# Patient Record
Sex: MALE | Race: WHITE
[De-identification: names, ages, dates, MRNs, and addresses within clinical notes are randomized per-mention and may not be internally consistent; named-entity substitution may affect disease eponyms.]

---

## 2021-09-29 ENCOUNTER — RESULT REVIEW (OUTPATIENT)
Age: 61
End: 2021-09-29

## 2021-09-29 ENCOUNTER — OUTPATIENT (OUTPATIENT)
Dept: OUTPATIENT SERVICES | Facility: HOSPITAL | Age: 61
LOS: 1 days | Discharge: HOME | End: 2021-09-29
Payer: COMMERCIAL

## 2021-09-29 ENCOUNTER — APPOINTMENT (OUTPATIENT)
Dept: WOUND CARE | Facility: HOSPITAL | Age: 61
End: 2021-09-29
Payer: COMMERCIAL

## 2021-09-29 VITALS
TEMPERATURE: 97.4 F | BODY MASS INDEX: 31.08 KG/M2 | OXYGEN SATURATION: 94 % | SYSTOLIC BLOOD PRESSURE: 175 MMHG | DIASTOLIC BLOOD PRESSURE: 78 MMHG | HEART RATE: 75 BPM | RESPIRATION RATE: 18 BRPM | WEIGHT: 250 LBS | HEIGHT: 75 IN

## 2021-09-29 DIAGNOSIS — Z78.9 OTHER SPECIFIED HEALTH STATUS: ICD-10-CM

## 2021-09-29 DIAGNOSIS — I10 ESSENTIAL (PRIMARY) HYPERTENSION: ICD-10-CM

## 2021-09-29 DIAGNOSIS — E11.621 TYPE 2 DIABETES MELLITUS WITH FOOT ULCER: ICD-10-CM

## 2021-09-29 DIAGNOSIS — Z83.3 FAMILY HISTORY OF DIABETES MELLITUS: ICD-10-CM

## 2021-09-29 DIAGNOSIS — Z87.891 PERSONAL HISTORY OF NICOTINE DEPENDENCE: ICD-10-CM

## 2021-09-29 DIAGNOSIS — E11.9 TYPE 2 DIABETES MELLITUS W/OUT COMPLICATIONS: ICD-10-CM

## 2021-09-29 PROBLEM — Z00.00 ENCOUNTER FOR PREVENTIVE HEALTH EXAMINATION: Status: ACTIVE | Noted: 2021-09-29

## 2021-09-29 PROCEDURE — G0463: CPT

## 2021-09-29 PROCEDURE — 87070 CULTURE OTHR SPECIMN AEROBIC: CPT

## 2021-09-29 PROCEDURE — 99203 OFFICE O/P NEW LOW 30 MIN: CPT

## 2021-09-29 RX ORDER — INSULIN LISPRO-AABC 100 [IU]/ML
100 INJECTION, SOLUTION SUBCUTANEOUS
Refills: 0 | Status: ACTIVE | COMMUNITY

## 2021-09-29 RX ORDER — VITAMIN B COMPLEX
CAPSULE ORAL
Refills: 0 | Status: ACTIVE | COMMUNITY

## 2021-09-29 RX ORDER — INSULIN GLARGINE 300 U/ML
300 INJECTION, SOLUTION SUBCUTANEOUS DAILY
Refills: 0 | Status: ACTIVE | COMMUNITY

## 2021-09-29 RX ORDER — CIPROFLOXACIN HYDROCHLORIDE 500 MG/1
500 TABLET, FILM COATED ORAL
Qty: 30 | Refills: 2 | Status: ACTIVE | COMMUNITY
Start: 2021-09-29 | End: 1900-01-01

## 2021-09-29 RX ORDER — UBIDECARENONE/VIT E ACET 100MG-5
CAPSULE ORAL
Refills: 0 | Status: ACTIVE | COMMUNITY

## 2021-09-29 NOTE — HISTORY OF PRESENT ILLNESS
[FreeTextEntry1] : DFU 3 5th toe left foot , skin, subcutaneous , fat and fascia , some erythema , no clinical signs of infection

## 2021-09-29 NOTE — PHYSICAL EXAM
[2 x 2] : 2 x 2  [0] : left 0 [1+] : left 1+ [Skin Ulcer] : ulcer [Alert] : alert [Oriented to Person] : oriented to person [Oriented to Place] : oriented to place [Calm] : calm [Varicose Veins Of Lower Extremities] : not present [Purpura] : no purpura  [Petechiae] : no petechiae [de-identified] : calm [de-identified] : Cad , HTN , CABG  [de-identified] : hammer toes  [de-identified] : DFU 3 , ssff , 5th toe left foot  [de-identified] : Diabetic neuropathy  [FreeTextEntry1] : Left foot lateral 5th digit  [FreeTextEntry2] : 0.5 [FreeTextEntry3] : 0.6 [FreeTextEntry4] : 0.1 [de-identified] : Scant Serous/sanguinous [de-identified] : Silver alginate [de-identified] : Cleansed with NS\par Paper tape  [de-identified] : Dorsalis Pedis: 0\par Posterior Tibialis: 0\par Doppler pulses:  Present. \par Dorsalis Pedis: Monophasic/Turbulent Bilateral                             Posterior Tibialis: Monophasic/Turbulent    Bilateral \par Extremity color: Normal \par Extremity temperature: Warm \par Capillary refill: < 3 sec\par JONAS: Auth submitted \par  [de-identified] : None [de-identified] : Normal [de-identified] : None [de-identified] : >75% [de-identified] : No [de-identified] : Every other day [de-identified] : Primary Dressing

## 2021-09-29 NOTE — REVIEW OF SYSTEMS
[Joint Stiffness] : joint stiffness [Skin Wound] : skin wound [Fever] : no fever [Chills] : no chills [Eye Pain] : no eye pain [Earache] : no earache [Shortness Of Breath] : no shortness of breath [Abdominal Pain] : no abdominal pain [Anxiety] : no anxiety [Easy Bleeding] : no tendency for easy bleeding [FreeTextEntry5] : Cabg , CAD , Diabetic cardio vascular disease  [de-identified] : DFU 3 5th toe left foot  [de-identified] : IDDM with neuropathy , long term insulin use  [de-identified] : МАРИНА

## 2021-09-29 NOTE — PLAN
[FreeTextEntry1] : Patient sent for x rays , MRI , vascular studies . C&S taken . Patient high risk for amputation , wound care . Rx Cipro 500mg . PTR 1 week   Spent 35 minutes for patient care and medical decision making.\par

## 2021-09-29 NOTE — ASSESSMENT
[Verbal] : Verbal [Written] : Written [Demo] : Demo [Patient] : Patient [Good - alert, interested, motivated] : Good - alert, interested, motivated [Verbalizes knowledge/Understanding] : Verbalizes knowledge/understanding [Dressing changes] : dressing changes [Foot Care] : foot care [Skin Care] : skin care [Signs and symptoms of infection] : sign and symptoms of infection [Nutrition] : nutrition [How and When to Call] : how and when to call [Labs and Tests] : labs and tests [Pain Management] : pain management [Patient responsibility to plan of care] : patient responsibility to plan of care [Glycemic Control] : glycemic control [] : Yes [Stable] : stable [Home] : Home [Ambulatory] : Ambulatory [Not Applicable - Long Term Care/Home Health Agency] : Long Term Care/Home Health Agency: Not Applicable [FreeTextEntry2] : Infection prevention\par Localized wound care \par Goal of remaining pain free regarding wounds.\par Promote optimal nutrition  [FreeTextEntry4] : auth submitted for MRI\par Auth submitted for vascular studies\par script provided for x - ray \par Culture swab obtained and sent to lab\par cipro E scribed \par Patient to bring in full med list next assessment \par Follow up in 1 week

## 2021-09-30 DIAGNOSIS — Z87.891 PERSONAL HISTORY OF NICOTINE DEPENDENCE: ICD-10-CM

## 2021-09-30 DIAGNOSIS — Z95.1 PRESENCE OF AORTOCORONARY BYPASS GRAFT: ICD-10-CM

## 2021-09-30 DIAGNOSIS — E11.621 TYPE 2 DIABETES MELLITUS WITH FOOT ULCER: ICD-10-CM

## 2021-09-30 DIAGNOSIS — Z83.3 FAMILY HISTORY OF DIABETES MELLITUS: ICD-10-CM

## 2021-09-30 DIAGNOSIS — E11.59 TYPE 2 DIABETES MELLITUS WITH OTHER CIRCULATORY COMPLICATIONS: ICD-10-CM

## 2021-09-30 DIAGNOSIS — Z79.82 LONG TERM (CURRENT) USE OF ASPIRIN: ICD-10-CM

## 2021-09-30 DIAGNOSIS — L97.523 NON-PRESSURE CHRONIC ULCER OF OTHER PART OF LEFT FOOT WITH NECROSIS OF MUSCLE: ICD-10-CM

## 2021-09-30 DIAGNOSIS — E11.40 TYPE 2 DIABETES MELLITUS WITH DIABETIC NEUROPATHY, UNSPECIFIED: ICD-10-CM

## 2021-09-30 DIAGNOSIS — Z79.4 LONG TERM (CURRENT) USE OF INSULIN: ICD-10-CM

## 2021-09-30 DIAGNOSIS — I11.9 HYPERTENSIVE HEART DISEASE WITHOUT HEART FAILURE: ICD-10-CM

## 2021-10-03 LAB
CULTURE RESULTS: SIGNIFICANT CHANGE UP
SPECIMEN SOURCE: SIGNIFICANT CHANGE UP

## 2021-10-06 ENCOUNTER — APPOINTMENT (OUTPATIENT)
Dept: WOUND CARE | Facility: HOSPITAL | Age: 61
End: 2021-10-06
Payer: COMMERCIAL

## 2021-10-06 ENCOUNTER — OUTPATIENT (OUTPATIENT)
Dept: OUTPATIENT SERVICES | Facility: HOSPITAL | Age: 61
LOS: 1 days | Discharge: HOME | End: 2021-10-06
Payer: COMMERCIAL

## 2021-10-06 VITALS
SYSTOLIC BLOOD PRESSURE: 157 MMHG | WEIGHT: 254 LBS | BODY MASS INDEX: 31.58 KG/M2 | HEIGHT: 75 IN | RESPIRATION RATE: 20 BRPM | HEART RATE: 82 BPM | OXYGEN SATURATION: 97 % | DIASTOLIC BLOOD PRESSURE: 75 MMHG | TEMPERATURE: 97.9 F

## 2021-10-06 DIAGNOSIS — E11.621 TYPE 2 DIABETES MELLITUS WITH FOOT ULCER: ICD-10-CM

## 2021-10-06 PROCEDURE — 99213 OFFICE O/P EST LOW 20 MIN: CPT

## 2021-10-06 PROCEDURE — G0463: CPT

## 2021-10-07 DIAGNOSIS — Z83.3 FAMILY HISTORY OF DIABETES MELLITUS: ICD-10-CM

## 2021-10-07 DIAGNOSIS — I11.9 HYPERTENSIVE HEART DISEASE WITHOUT HEART FAILURE: ICD-10-CM

## 2021-10-07 DIAGNOSIS — Z95.1 PRESENCE OF AORTOCORONARY BYPASS GRAFT: ICD-10-CM

## 2021-10-07 DIAGNOSIS — E11.40 TYPE 2 DIABETES MELLITUS WITH DIABETIC NEUROPATHY, UNSPECIFIED: ICD-10-CM

## 2021-10-07 DIAGNOSIS — L97.523 NON-PRESSURE CHRONIC ULCER OF OTHER PART OF LEFT FOOT WITH NECROSIS OF MUSCLE: ICD-10-CM

## 2021-10-07 DIAGNOSIS — E11.59 TYPE 2 DIABETES MELLITUS WITH OTHER CIRCULATORY COMPLICATIONS: ICD-10-CM

## 2021-10-07 DIAGNOSIS — Z87.891 PERSONAL HISTORY OF NICOTINE DEPENDENCE: ICD-10-CM

## 2021-10-07 DIAGNOSIS — E11.621 TYPE 2 DIABETES MELLITUS WITH FOOT ULCER: ICD-10-CM

## 2021-10-07 DIAGNOSIS — Z79.82 LONG TERM (CURRENT) USE OF ASPIRIN: ICD-10-CM

## 2021-10-07 DIAGNOSIS — Z79.4 LONG TERM (CURRENT) USE OF INSULIN: ICD-10-CM

## 2021-10-07 NOTE — PHYSICAL EXAM
[0] : left 0 [1+] : left 1+ [Skin Ulcer] : ulcer [Alert] : alert [Oriented to Person] : oriented to person [Oriented to Place] : oriented to place [Calm] : calm [Varicose Veins Of Lower Extremities] : not present [Purpura] : no purpura  [Petechiae] : no petechiae [de-identified] : calm [de-identified] : Cad , HTN , CABG  [de-identified] : hammer toes  [de-identified] : DFU 3 , ssff , 5th toe left foot  [de-identified] : Diabetic neuropathy  [FreeTextEntry1] : Left Foot Lateral 5th Digit [FreeTextEntry2] : 0.4 [FreeTextEntry3] : 0.3 [FreeTextEntry4] : 0.1 [de-identified] : Serosanguineous [de-identified] : Callus [de-identified] : Silver Alginate [de-identified] : Cleansed with Normal Saline\par  [TWNoteComboBox4] : Small [TWNoteComboBox5] : No [de-identified] : No [de-identified] : None [de-identified] : None [de-identified] : 100% [de-identified] : No [de-identified] : 3x Weekly

## 2021-10-07 NOTE — PLAN
[FreeTextEntry1] : Ulcer is improving , patient to have awaiting x rays , MRI and base line vascular studies , continue wound care and off loading   Spent 20 minutes for patient care and medical decision making.\par

## 2021-10-07 NOTE — ASSESSMENT
[Verbal] : Verbal [Patient] : Patient [Good - alert, interested, motivated] : Good - alert, interested, motivated [Verbalizes knowledge/Understanding] : Verbalizes knowledge/understanding [Dressing changes] : dressing changes [Foot Care] : foot care [Skin Care] : skin care [Signs and symptoms of infection] : sign and symptoms of infection [How and When to Call] : how and when to call [Off-loading] : off-loading [Patient responsibility to plan of care] : patient responsibility to plan of care [Stable] : stable [Home] : Home [Ambulatory] : Ambulatory [Not Applicable - Long Term Care/Home Health Agency] : Long Term Care/Home Health Agency: Not Applicable [] : No [FreeTextEntry2] : Restore Skin Integrity\par Infection Control\par Localized wound care\par Maintain acceptable pain levels at satisfactory relief.\par Demonstrates use of both nonpharmacological and pharmacological pain relief strategies [FreeTextEntry4] : Xray, MRI, Vascular Studies were just approved by insurance per patient, Patient states he will schedule studies needed prior to next assessment if possible. MD Aware\par F/U to [facility] in 1 week\par

## 2021-10-07 NOTE — HISTORY OF PRESENT ILLNESS
[FreeTextEntry1] : DFU 5 th toe left foot improving , no soi , open skin, subcutaneous and fat tissue

## 2021-10-07 NOTE — REVIEW OF SYSTEMS
[Joint Stiffness] : joint stiffness [Skin Wound] : skin wound [Fever] : no fever [Chills] : no chills [Eye Pain] : no eye pain [Earache] : no earache [Shortness Of Breath] : no shortness of breath [Abdominal Pain] : no abdominal pain [Anxiety] : no anxiety [Easy Bleeding] : no tendency for easy bleeding [FreeTextEntry5] : Cabg , CAD , Diabetic cardio vascular disease  [de-identified] : DFU 3 5th toe left foot  [de-identified] : IDDM with neuropathy , long term insulin use  [de-identified] : МАРИНА

## 2021-10-14 ENCOUNTER — OUTPATIENT (OUTPATIENT)
Dept: OUTPATIENT SERVICES | Facility: HOSPITAL | Age: 61
LOS: 1 days | End: 2021-10-14
Payer: COMMERCIAL

## 2021-10-14 ENCOUNTER — RESULT REVIEW (OUTPATIENT)
Age: 61
End: 2021-10-14

## 2021-10-14 DIAGNOSIS — E11.621 TYPE 2 DIABETES MELLITUS WITH FOOT ULCER: ICD-10-CM

## 2021-10-14 PROCEDURE — 73630 X-RAY EXAM OF FOOT: CPT | Mod: 26,50

## 2021-10-14 PROCEDURE — 73630 X-RAY EXAM OF FOOT: CPT

## 2021-10-14 PROCEDURE — A9579: CPT

## 2021-10-14 PROCEDURE — 93923 UPR/LXTR ART STDY 3+ LVLS: CPT | Mod: 26

## 2021-10-14 PROCEDURE — 73720 MRI LWR EXTREMITY W/O&W/DYE: CPT

## 2021-10-14 PROCEDURE — 73720 MRI LWR EXTREMITY W/O&W/DYE: CPT | Mod: 26,LT

## 2021-10-14 PROCEDURE — 93923 UPR/LXTR ART STDY 3+ LVLS: CPT

## 2021-10-20 ENCOUNTER — APPOINTMENT (OUTPATIENT)
Dept: WOUND CARE | Facility: HOSPITAL | Age: 61
End: 2021-10-20
Payer: COMMERCIAL

## 2021-10-20 ENCOUNTER — OUTPATIENT (OUTPATIENT)
Dept: OUTPATIENT SERVICES | Facility: HOSPITAL | Age: 61
LOS: 1 days | Discharge: HOME | End: 2021-10-20
Payer: COMMERCIAL

## 2021-10-20 VITALS
BODY MASS INDEX: 31.58 KG/M2 | HEIGHT: 75 IN | OXYGEN SATURATION: 93 % | RESPIRATION RATE: 18 BRPM | HEART RATE: 78 BPM | DIASTOLIC BLOOD PRESSURE: 84 MMHG | WEIGHT: 254 LBS | TEMPERATURE: 98.1 F | SYSTOLIC BLOOD PRESSURE: 194 MMHG

## 2021-10-20 DIAGNOSIS — L97.523 NON-PRESSURE CHRONIC ULCER OF OTHER PART OF LEFT FOOT WITH NECROSIS OF MUSCLE: ICD-10-CM

## 2021-10-20 DIAGNOSIS — E11.621 TYPE 2 DIABETES MELLITUS WITH FOOT ULCER: ICD-10-CM

## 2021-10-20 PROCEDURE — G0463: CPT

## 2021-10-20 PROCEDURE — 99213 OFFICE O/P EST LOW 20 MIN: CPT

## 2021-10-20 RX ORDER — CARVEDILOL 25 MG/1
25 TABLET, FILM COATED ORAL
Refills: 0 | Status: ACTIVE | COMMUNITY

## 2021-10-20 RX ORDER — AMLODIPINE BESYLATE AND BENAZEPRIL HYDROCHLORIDE 10; 40 MG/1; MG/1
10-40 CAPSULE ORAL
Refills: 0 | Status: ACTIVE | COMMUNITY

## 2021-10-20 RX ORDER — EPLERENONE 25 MG/1
25 TABLET, COATED ORAL
Refills: 0 | Status: ACTIVE | COMMUNITY

## 2021-10-20 RX ORDER — METFORMIN HYDROCHLORIDE 1000 MG/1
1000 TABLET, COATED ORAL
Refills: 0 | Status: ACTIVE | COMMUNITY

## 2021-10-20 RX ORDER — DULAGLUTIDE 1.5 MG/.5ML
1.5 INJECTION, SOLUTION SUBCUTANEOUS
Refills: 0 | Status: ACTIVE | COMMUNITY

## 2021-10-20 RX ORDER — ATORVASTATIN CALCIUM 40 MG/1
40 TABLET, FILM COATED ORAL
Refills: 0 | Status: ACTIVE | COMMUNITY

## 2021-10-20 RX ORDER — EMPAGLIFLOZIN 25 MG/1
25 TABLET, FILM COATED ORAL
Refills: 0 | Status: ACTIVE | COMMUNITY

## 2021-10-21 DIAGNOSIS — Z83.3 FAMILY HISTORY OF DIABETES MELLITUS: ICD-10-CM

## 2021-10-21 DIAGNOSIS — Z95.1 PRESENCE OF AORTOCORONARY BYPASS GRAFT: ICD-10-CM

## 2021-10-21 DIAGNOSIS — E11.59 TYPE 2 DIABETES MELLITUS WITH OTHER CIRCULATORY COMPLICATIONS: ICD-10-CM

## 2021-10-21 DIAGNOSIS — L97.526 NON-PRESSURE CHRONIC ULCER OF OTHER PART OF LEFT FOOT WITH BONE INVOLVEMENT WITHOUT EVIDENCE OF NECROSIS: ICD-10-CM

## 2021-10-21 DIAGNOSIS — E11.40 TYPE 2 DIABETES MELLITUS WITH DIABETIC NEUROPATHY, UNSPECIFIED: ICD-10-CM

## 2021-10-21 DIAGNOSIS — E11.621 TYPE 2 DIABETES MELLITUS WITH FOOT ULCER: ICD-10-CM

## 2021-10-21 DIAGNOSIS — Z87.891 PERSONAL HISTORY OF NICOTINE DEPENDENCE: ICD-10-CM

## 2021-10-21 DIAGNOSIS — Z79.4 LONG TERM (CURRENT) USE OF INSULIN: ICD-10-CM

## 2021-10-21 DIAGNOSIS — Z79.82 LONG TERM (CURRENT) USE OF ASPIRIN: ICD-10-CM

## 2021-10-21 DIAGNOSIS — I11.9 HYPERTENSIVE HEART DISEASE WITHOUT HEART FAILURE: ICD-10-CM

## 2021-10-21 PROBLEM — L97.523: Status: ACTIVE | Noted: 2021-09-29

## 2021-10-21 NOTE — REVIEW OF SYSTEMS
[Joint Stiffness] : joint stiffness [Skin Wound] : skin wound [Fever] : no fever [Chills] : no chills [Eye Pain] : no eye pain [Earache] : no earache [Loss Of Hearing] : no hearing loss [Shortness Of Breath] : no shortness of breath [Abdominal Pain] : no abdominal pain [Anxiety] : no anxiety [Easy Bleeding] : no tendency for easy bleeding [FreeTextEntry5] : Cabg , CAD , Diabetic cardio vascular disease  [de-identified] : DFU 3 5th toe left foot , probes to bone + Om on MRI [de-identified] : IDDM with neuropathy , long term insulin use  [de-identified] : МАРИНА

## 2021-10-21 NOTE — ASSESSMENT
[Verbal] : Verbal [Patient] : Patient [Good - alert, interested, motivated] : Good - alert, interested, motivated [Verbalizes knowledge/Understanding] : Verbalizes knowledge/understanding [Dressing changes] : dressing changes [Skin Care] : skin care [Signs and symptoms of infection] : sign and symptoms of infection [How and When to Call] : how and when to call [Patient responsibility to plan of care] : patient responsibility to plan of care [] : Yes [Stable] : stable [Home] : Home [Ambulatory] : Ambulatory [Not Applicable - Long Term Care/Home Health Agency] : Long Term Care/Home Health Agency: Not Applicable [FreeTextEntry2] : Restore Skin Integrity\par Infection Control\par Localized wound care\par Maintain acceptable pain levels at satisfactory relief.\par Demonstrates use of both nonpharmacological and pharmacological pain relief strategies [FreeTextEntry4] : DPM Discussed and Reviewed Vascular Studies, MRI and Xray with Patient, Discussion of Surgical Intervention (Arthroplasty) with Patient, Patient Stated understanding. \par DPM Referred Patient to see Vascular Surgeon for clearance prior to procedure based on vascular report, Patient Stated understanding, Copies of report given to patient today.\par Authorization Submitted for Arthroplasty per DPM orders.\par F/U to [facility] on 10/25/21 for Vascular Consult with Dr. Michael HOUSTON

## 2021-10-21 NOTE — PLAN
[FreeTextEntry1] : Reviewed all exams with patient , patient to See Dr Acevedo or his own vascular surgeon for vascular clearance . All risks and benefits have been discussed , all his questions answered . Patient will need arthroplasty of the 5th toe left foot to resolve ulcer and prevent further progression of the OM . Patient understands . Once patient is cleared vascular will procede with arthroplasty Spent 20 minutes for patient care and medical decision making.\par

## 2021-10-21 NOTE — PHYSICAL EXAM
[0] : left 0 [1+] : left 1+ [Skin Ulcer] : ulcer [Alert] : alert [Oriented to Person] : oriented to person [Oriented to Place] : oriented to place [Calm] : calm [Varicose Veins Of Lower Extremities] : not present [Purpura] : no purpura  [Petechiae] : no petechiae [de-identified] : calm [de-identified] : Cad , HTN , CABG  [de-identified] : hammer toes  [de-identified] : DFU 3 , ssff , 5th toe left foot + OM on MRI  [de-identified] : Diabetic neuropathy  [de-identified] : DPM Shaved Callus [FreeTextEntry1] : Left Foot Lateral 5th Digit [FreeTextEntry2] : 0.3 [FreeTextEntry3] : 0.3 [FreeTextEntry4] : 0.1 [de-identified] : scant Serosanguineous [de-identified] : Callus [de-identified] : Silver Alginate [de-identified] : Cleansed with Normal Saline\par  [TWNoteComboBox5] : No [de-identified] : No [de-identified] : None [de-identified] : None [de-identified] : 3x Weekly

## 2021-10-25 ENCOUNTER — APPOINTMENT (OUTPATIENT)
Dept: WOUND CARE | Facility: HOSPITAL | Age: 61
End: 2021-10-25
Payer: COMMERCIAL

## 2021-10-25 ENCOUNTER — OUTPATIENT (OUTPATIENT)
Dept: OUTPATIENT SERVICES | Facility: HOSPITAL | Age: 61
LOS: 1 days | Discharge: HOME | End: 2021-10-25
Payer: COMMERCIAL

## 2021-10-25 VITALS
WEIGHT: 254 LBS | OXYGEN SATURATION: 97 % | TEMPERATURE: 98.2 F | HEART RATE: 78 BPM | HEIGHT: 75 IN | SYSTOLIC BLOOD PRESSURE: 186 MMHG | BODY MASS INDEX: 31.58 KG/M2 | DIASTOLIC BLOOD PRESSURE: 86 MMHG | RESPIRATION RATE: 18 BRPM

## 2021-10-25 DIAGNOSIS — E11.621 TYPE 2 DIABETES MELLITUS WITH FOOT ULCER: ICD-10-CM

## 2021-10-25 PROCEDURE — 99213 OFFICE O/P EST LOW 20 MIN: CPT

## 2021-10-25 PROCEDURE — G0463: CPT

## 2021-10-25 NOTE — PHYSICAL EXAM
[1+] : left 1+ [Ankle Swelling (On Exam)] : not present [Varicose Veins Of Lower Extremities] : not present [] : not present [FreeTextEntry1] : weak L foot pulses. Cap ref 3 sec [de-identified] : l 5th toe callous surrounding 1 mm opening

## 2021-10-25 NOTE — HISTORY OF PRESENT ILLNESS
[FreeTextEntry1] : 60 yo M with hx of DM and a l 5 digt DFU for about a month. Has been slowly healing but still deep centrally. Denies an y LE pain or claudication. Thinks it may have been his shoe but he does not have paresthesias. He has hx of  CAD and CABG. MRI does not show osteo.

## 2021-10-25 NOTE — ASSESSMENT
[FreeTextEntry1] : 60 yo M with hx of DM and DFU. L 5th toe is slowly improving but its centrally deep. Dr Mata planning on performing surgery when cleared.

## 2021-10-26 DIAGNOSIS — Z79.84 LONG TERM (CURRENT) USE OF ORAL HYPOGLYCEMIC DRUGS: ICD-10-CM

## 2021-10-26 DIAGNOSIS — Z79.82 LONG TERM (CURRENT) USE OF ASPIRIN: ICD-10-CM

## 2021-10-26 DIAGNOSIS — I25.810 ATHEROSCLEROSIS OF CORONARY ARTERY BYPASS GRAFT(S) WITHOUT ANGINA PECTORIS: ICD-10-CM

## 2021-10-26 DIAGNOSIS — E11.621 TYPE 2 DIABETES MELLITUS WITH FOOT ULCER: ICD-10-CM

## 2021-10-26 DIAGNOSIS — L84 CORNS AND CALLOSITIES: ICD-10-CM

## 2021-10-26 DIAGNOSIS — Z79.899 OTHER LONG TERM (CURRENT) DRUG THERAPY: ICD-10-CM

## 2021-10-26 DIAGNOSIS — Z87.891 PERSONAL HISTORY OF NICOTINE DEPENDENCE: ICD-10-CM

## 2021-10-26 DIAGNOSIS — L97.526 NON-PRESSURE CHRONIC ULCER OF OTHER PART OF LEFT FOOT WITH BONE INVOLVEMENT WITHOUT EVIDENCE OF NECROSIS: ICD-10-CM

## 2021-10-26 DIAGNOSIS — Z95.1 PRESENCE OF AORTOCORONARY BYPASS GRAFT: ICD-10-CM

## 2021-10-26 DIAGNOSIS — Z95.5 PRESENCE OF CORONARY ANGIOPLASTY IMPLANT AND GRAFT: ICD-10-CM

## 2021-10-26 DIAGNOSIS — Z83.3 FAMILY HISTORY OF DIABETES MELLITUS: ICD-10-CM

## 2021-10-26 DIAGNOSIS — Z79.4 LONG TERM (CURRENT) USE OF INSULIN: ICD-10-CM

## 2021-11-02 ENCOUNTER — APPOINTMENT (OUTPATIENT)
Dept: WOUND CARE | Facility: HOSPITAL | Age: 61
End: 2021-11-02
Payer: COMMERCIAL

## 2021-11-02 ENCOUNTER — OUTPATIENT (OUTPATIENT)
Dept: OUTPATIENT SERVICES | Facility: HOSPITAL | Age: 61
LOS: 1 days | Discharge: HOME | End: 2021-11-02
Payer: COMMERCIAL

## 2021-11-02 VITALS
HEIGHT: 75 IN | BODY MASS INDEX: 31.58 KG/M2 | WEIGHT: 254 LBS | TEMPERATURE: 98.7 F | DIASTOLIC BLOOD PRESSURE: 90 MMHG | SYSTOLIC BLOOD PRESSURE: 170 MMHG | RESPIRATION RATE: 18 BRPM | OXYGEN SATURATION: 98 % | HEART RATE: 73 BPM

## 2021-11-02 DIAGNOSIS — E11.621 TYPE 2 DIABETES MELLITUS WITH FOOT ULCER: ICD-10-CM

## 2021-11-02 PROCEDURE — G0463: CPT

## 2021-11-02 PROCEDURE — 99213 OFFICE O/P EST LOW 20 MIN: CPT

## 2021-11-03 DIAGNOSIS — Z79.84 LONG TERM (CURRENT) USE OF ORAL HYPOGLYCEMIC DRUGS: ICD-10-CM

## 2021-11-03 DIAGNOSIS — E11.59 TYPE 2 DIABETES MELLITUS WITH OTHER CIRCULATORY COMPLICATIONS: ICD-10-CM

## 2021-11-03 DIAGNOSIS — L97.526 NON-PRESSURE CHRONIC ULCER OF OTHER PART OF LEFT FOOT WITH BONE INVOLVEMENT WITHOUT EVIDENCE OF NECROSIS: ICD-10-CM

## 2021-11-03 DIAGNOSIS — Z95.1 PRESENCE OF AORTOCORONARY BYPASS GRAFT: ICD-10-CM

## 2021-11-03 DIAGNOSIS — Z95.5 PRESENCE OF CORONARY ANGIOPLASTY IMPLANT AND GRAFT: ICD-10-CM

## 2021-11-03 DIAGNOSIS — Z79.82 LONG TERM (CURRENT) USE OF ASPIRIN: ICD-10-CM

## 2021-11-03 DIAGNOSIS — E11.40 TYPE 2 DIABETES MELLITUS WITH DIABETIC NEUROPATHY, UNSPECIFIED: ICD-10-CM

## 2021-11-03 DIAGNOSIS — E11.621 TYPE 2 DIABETES MELLITUS WITH FOOT ULCER: ICD-10-CM

## 2021-11-03 DIAGNOSIS — Z83.3 FAMILY HISTORY OF DIABETES MELLITUS: ICD-10-CM

## 2021-11-03 DIAGNOSIS — Z87.891 PERSONAL HISTORY OF NICOTINE DEPENDENCE: ICD-10-CM

## 2021-11-03 DIAGNOSIS — Z79.4 LONG TERM (CURRENT) USE OF INSULIN: ICD-10-CM

## 2021-11-03 DIAGNOSIS — I11.9 HYPERTENSIVE HEART DISEASE WITHOUT HEART FAILURE: ICD-10-CM

## 2021-11-03 NOTE — REVIEW OF SYSTEMS
[Fever] : no fever [Chills] : no chills [Eye Pain] : no eye pain [Earache] : no earache [Loss Of Hearing] : no hearing loss [Shortness Of Breath] : no shortness of breath [Abdominal Pain] : no abdominal pain [Joint Stiffness] : joint stiffness [Skin Wound] : skin wound [Anxiety] : no anxiety [Easy Bleeding] : no tendency for easy bleeding [FreeTextEntry5] : Cabg , CAD , Diabetic cardio vascular disease  [de-identified] : DFU 3 5th toe left foot , probes to bone + Om on MRI [de-identified] : IDDM with neuropathy , long term insulin use  [de-identified] : МАРИНА

## 2021-11-03 NOTE — ASSESSMENT
[Verbal] : Verbal [Written] : Written [Demo] : Demo [Patient] : Patient [Good - alert, interested, motivated] : Good - alert, interested, motivated [Verbalizes knowledge/Understanding] : Verbalizes knowledge/understanding [Dressing changes] : dressing changes [Foot Care] : foot care [Skin Care] : skin care [Signs and symptoms of infection] : sign and symptoms of infection [Surgery] : surgery [Nutrition] : nutrition [How and When to Call] : how and when to call [Pain Management] : pain management [Off-loading] : off-loading [Patient responsibility to plan of care] : patient responsibility to plan of care [Glycemic Control] : glycemic control [Stable] : stable [Home] : Home [Ambulatory] : Ambulatory [Not Applicable - Long Term Care/Home Health Agency] : Long Term Care/Home Health Agency: Not Applicable [] : No [FreeTextEntry2] : Infection prevention\par Localized wound care \par Goal of remaining pain free regarding wounds.\par Promote optimal nutrition  [FreeTextEntry3] : wound remains the same in status  [FreeTextEntry4] : Patient seeing his cardiologist to get the Okay for arthroplasty of left foot 5th digit. He will also be getting angiogram with Dr. Márquez. \par Follow up in 2 weeks

## 2021-11-03 NOTE — PHYSICAL EXAM
[0] : left 0 [1+] : left 1+ [Varicose Veins Of Lower Extremities] : not present [Purpura] : no purpura  [Petechiae] : no petechiae [Skin Ulcer] : ulcer [Alert] : alert [Oriented to Person] : oriented to person [Oriented to Place] : oriented to place [Calm] : calm [de-identified] : calm [de-identified] : Cad , HTN , CABG  [de-identified] : hammer toes  [de-identified] : DFU 3 , ssff , 5th toe left foot + OM on MRI  [de-identified] : Diabetic neuropathy  [FreeTextEntry1] : Left foot lateral 5th digit  [FreeTextEntry2] : 0.3 [FreeTextEntry3] : 0.3 [FreeTextEntry4] : 0.1 [de-identified] : scant Serous/sanguinous [de-identified] : Callus  [de-identified] : Silver alginate [de-identified] : Cleansed with NS\par Paper tape  [de-identified] : other [de-identified] : None [de-identified] : None [de-identified] : 100% [de-identified] : No [de-identified] : Every other day [de-identified] : Primary Dressing

## 2021-11-03 NOTE — PLAN
[FreeTextEntry1] : Reviewed all exams with patient , patient to have vascular procedure/clearance with his own vascular surgeon or dr toro. All risks and benefits have been discussed , all his questions answered . Patient will need arthroplasty of the 5th toe left foot to resolve ulcer and prevent further progression of the OM . Patient understands . Once patient is cleared vascular will procede with arthroplasty Spent 20 minutes for patient care and medical decision making.\par

## 2021-11-16 ENCOUNTER — OUTPATIENT (OUTPATIENT)
Dept: OUTPATIENT SERVICES | Facility: HOSPITAL | Age: 61
LOS: 1 days | Discharge: HOME | End: 2021-11-16
Payer: COMMERCIAL

## 2021-11-16 ENCOUNTER — APPOINTMENT (OUTPATIENT)
Dept: WOUND CARE | Facility: HOSPITAL | Age: 61
End: 2021-11-16
Payer: COMMERCIAL

## 2021-11-16 ENCOUNTER — NON-APPOINTMENT (OUTPATIENT)
Age: 61
End: 2021-11-16

## 2021-11-16 VITALS
RESPIRATION RATE: 18 BRPM | BODY MASS INDEX: 31.58 KG/M2 | DIASTOLIC BLOOD PRESSURE: 86 MMHG | TEMPERATURE: 98.8 F | HEIGHT: 75 IN | OXYGEN SATURATION: 98 % | WEIGHT: 254 LBS | HEART RATE: 73 BPM | SYSTOLIC BLOOD PRESSURE: 185 MMHG

## 2021-11-16 DIAGNOSIS — Z95.1 PRESENCE OF AORTOCORONARY BYPASS GRAFT: ICD-10-CM

## 2021-11-16 DIAGNOSIS — Z79.84 LONG TERM (CURRENT) USE OF ORAL HYPOGLYCEMIC DRUGS: ICD-10-CM

## 2021-11-16 DIAGNOSIS — Z87.891 PERSONAL HISTORY OF NICOTINE DEPENDENCE: ICD-10-CM

## 2021-11-16 DIAGNOSIS — I11.9 HYPERTENSIVE HEART DISEASE WITHOUT HEART FAILURE: ICD-10-CM

## 2021-11-16 DIAGNOSIS — Z83.3 FAMILY HISTORY OF DIABETES MELLITUS: ICD-10-CM

## 2021-11-16 DIAGNOSIS — Z79.4 LONG TERM (CURRENT) USE OF INSULIN: ICD-10-CM

## 2021-11-16 DIAGNOSIS — Z79.82 LONG TERM (CURRENT) USE OF ASPIRIN: ICD-10-CM

## 2021-11-16 DIAGNOSIS — E11.621 TYPE 2 DIABETES MELLITUS WITH FOOT ULCER: ICD-10-CM

## 2021-11-16 DIAGNOSIS — L97.526 NON-PRESSURE CHRONIC ULCER OF OTHER PART OF LEFT FOOT WITH BONE INVOLVEMENT WITHOUT EVIDENCE OF NECROSIS: ICD-10-CM

## 2021-11-16 DIAGNOSIS — E11.59 TYPE 2 DIABETES MELLITUS WITH OTHER CIRCULATORY COMPLICATIONS: ICD-10-CM

## 2021-11-16 DIAGNOSIS — Z95.5 PRESENCE OF CORONARY ANGIOPLASTY IMPLANT AND GRAFT: ICD-10-CM

## 2021-11-16 DIAGNOSIS — E71.40 DISORDER OF CARNITINE METABOLISM, UNSPECIFIED: ICD-10-CM

## 2021-11-16 PROCEDURE — G0463: CPT

## 2021-11-16 PROCEDURE — 87070 CULTURE OTHR SPECIMN AEROBIC: CPT

## 2021-11-16 PROCEDURE — 99213 OFFICE O/P EST LOW 20 MIN: CPT

## 2021-11-16 NOTE — ASSESSMENT
[Verbal] : Verbal [Written] : Written [Demo] : Demo [Patient] : Patient [Good - alert, interested, motivated] : Good - alert, interested, motivated [Verbalizes knowledge/Understanding] : Verbalizes knowledge/understanding [Dressing changes] : dressing changes [Foot Care] : foot care [Skin Care] : skin care [Pressure relief] : pressure relief [Signs and symptoms of infection] : sign and symptoms of infection [Surgery] : surgery [Nutrition] : nutrition [How and When to Call] : how and when to call [Pain Management] : pain management [Off-loading] : off-loading [Patient responsibility to plan of care] : patient responsibility to plan of care [Glycemic Control] : glycemic control [] : Yes [Stable] : stable [Home] : Home [Ambulatory] : Ambulatory [Not Applicable - Long Term Care/Home Health Agency] : Long Term Care/Home Health Agency: Not Applicable [FreeTextEntry2] : Infection prevention\par Localized wound care \par Goal of remaining pain free regarding wounds.\par Promote optimal nutrition \par Encourage Glycemic Control\par Pressure Relief [FreeTextEntry4] : pt cardiologist Dr. Kyler Sr authorized for Angioplasty procedure to proceed Dec 7th 2021 at [facility] by Dr. Sher (Vascular Surgery). Pre op testing will be done on Dec 6th 2021. After pt can proceed with Left Foot 5th digit arthroplasty by Dr. Mata \par Pt will F/U in 1 week for Wound care assessment\par Pt is seeing Dr. Johnson for routine podiatry care\par Culture Swab obtained and sent to lab

## 2021-11-16 NOTE — PHYSICAL EXAM
[0] : left 0 [1+] : left 1+ [Varicose Veins Of Lower Extremities] : not present [Purpura] : no purpura  [Petechiae] : no petechiae [Skin Ulcer] : ulcer [Alert] : alert [Oriented to Person] : oriented to person [Oriented to Place] : oriented to place [Calm] : calm [de-identified] : calm [de-identified] : Cad , HTN , CABG  [de-identified] : hammer toes  [de-identified] : DFU 3 , ssff , 5th toe left foot + OM on MRI  [de-identified] : Diabetic neuropathy  [de-identified] : eschar and callus removed by DPM\par open ulcer was  revealed, wound bed is 100% granular. Wound measured 0.2 x 0.2 x 0.2 and  Probes to bone\par Small sanguineous drainage [FreeTextEntry1] : Left foot lateral 5th digit [FreeTextEntry2] : 0.1 [FreeTextEntry3] : 0.3 [FreeTextEntry4] : 0.1 [de-identified] : none [de-identified] : Callus [de-identified] : Silver alginate [de-identified] : Cleansed with NS\par Paper tape  [TWNoteComboBox5] : No [TWNoteComboBox6] : Diabetic [de-identified] : No [de-identified] : other [de-identified] : None [de-identified] : None [de-identified] : None [de-identified] : No [de-identified] : Every other day [de-identified] : Primary Dressing

## 2021-11-16 NOTE — REVIEW OF SYSTEMS
[Fever] : no fever [Chills] : no chills [Eye Pain] : no eye pain [Earache] : no earache [Loss Of Hearing] : no hearing loss [Shortness Of Breath] : no shortness of breath [Abdominal Pain] : no abdominal pain [Joint Stiffness] : joint stiffness [Skin Wound] : skin wound [Anxiety] : no anxiety [Easy Bleeding] : no tendency for easy bleeding [FreeTextEntry5] : Cabg , CAD , Diabetic cardio vascular disease  [de-identified] : DFU 3 5th toe left foot , probes to bone + Om on MRI [de-identified] : IDDM with neuropathy , long term insulin use  [de-identified] : МАРИНА

## 2021-11-16 NOTE — PLAN
[FreeTextEntry1] : Patient to have angiogram on the left leg Dec 7th ,, after that patient will have arthroplasty of the left 5th toe removing the PIPJ , patient agrees with the plan and understands all the risks and benefits Spent 20 minutes for patient care and medical decision making.\par

## 2021-11-23 ENCOUNTER — APPOINTMENT (OUTPATIENT)
Dept: WOUND CARE | Facility: HOSPITAL | Age: 61
End: 2021-11-23
Payer: COMMERCIAL

## 2021-11-23 ENCOUNTER — OUTPATIENT (OUTPATIENT)
Dept: OUTPATIENT SERVICES | Facility: HOSPITAL | Age: 61
LOS: 1 days | Discharge: HOME | End: 2021-11-23
Payer: COMMERCIAL

## 2021-11-23 VITALS — SYSTOLIC BLOOD PRESSURE: 178 MMHG | DIASTOLIC BLOOD PRESSURE: 80 MMHG

## 2021-11-23 VITALS
TEMPERATURE: 98.1 F | DIASTOLIC BLOOD PRESSURE: 93 MMHG | HEART RATE: 74 BPM | WEIGHT: 254 LBS | BODY MASS INDEX: 32.6 KG/M2 | SYSTOLIC BLOOD PRESSURE: 211 MMHG | OXYGEN SATURATION: 99 % | HEIGHT: 74 IN

## 2021-11-23 VITALS — SYSTOLIC BLOOD PRESSURE: 209 MMHG | DIASTOLIC BLOOD PRESSURE: 88 MMHG

## 2021-11-23 DIAGNOSIS — E11.621 TYPE 2 DIABETES MELLITUS WITH FOOT ULCER: ICD-10-CM

## 2021-11-23 PROCEDURE — 99213 OFFICE O/P EST LOW 20 MIN: CPT

## 2021-11-23 PROCEDURE — G0463: CPT

## 2021-11-24 NOTE — PHYSICAL EXAM
[2 x 2] : 2 x 2  [0] : left 0 [1+] : left 1+ [Varicose Veins Of Lower Extremities] : not present [Purpura] : no purpura  [Petechiae] : no petechiae [Skin Ulcer] : ulcer [Alert] : alert [Oriented to Person] : oriented to person [Oriented to Place] : oriented to place [Calm] : calm [de-identified] : calm [de-identified] : Cad , HTN , CABG  [de-identified] : hammer toes  [de-identified] : DFU 3 , ssff , 5th toe left foot + OM on MRI  [de-identified] : Diabetic neuropathy  [FreeTextEntry1] : Left foot lateral 5th digit  [FreeTextEntry2] : 0.1 [FreeTextEntry3] : 0.2 [FreeTextEntry4] : 0.1 [de-identified] : callused [de-identified] : Alginate Ag [de-identified] : NSC\par DD\par  [TWNoteComboBox4] : None [TWNoteComboBox5] : No [TWNoteComboBox6] : Diabetic [de-identified] : No [de-identified] : other [de-identified] : None [de-identified] : None [de-identified] : False [de-identified] : 3x Weekly [de-identified] : Primary Dressing

## 2021-11-24 NOTE — REVIEW OF SYSTEMS
[Fever] : no fever [Chills] : no chills [Eye Pain] : no eye pain [Earache] : no earache [Loss Of Hearing] : no hearing loss [Shortness Of Breath] : no shortness of breath [Abdominal Pain] : no abdominal pain [Joint Stiffness] : joint stiffness [Skin Wound] : skin wound [Anxiety] : no anxiety [Easy Bleeding] : no tendency for easy bleeding [FreeTextEntry5] : Cabg , CAD , Diabetic cardio vascular disease  [de-identified] : DFU 3 5th toe left foot , probes to bone + Om on MRI [de-identified] : IDDM with neuropathy , long term insulin use  [de-identified] : МАРИНА

## 2021-11-24 NOTE — PLAN
[FreeTextEntry1] : Patient to have angiogram on the left leg Dec 7th , after that patient will have arthroplasty of the left 5th toe removing the PIPJ , patient agrees with the plan and understands all the risks and benefits Spent 20 minutes for patient care and medical decision making.

## 2021-11-24 NOTE — ASSESSMENT
[Verbal] : Verbal [Written] : Written [Demo] : Demo [Patient] : Patient [Good - alert, interested, motivated] : Good - alert, interested, motivated [Verbalizes knowledge/Understanding] : Verbalizes knowledge/understanding [Stable] : stable [Home] : Home [Ambulatory] : Ambulatory [Not Applicable - Long Term Care/Home Health Agency] : Long Term Care/Home Health Agency: Not Applicable [] : No [FreeTextEntry2] : Infection prevention \par Wound care (dressing changes)\par Maintain optimal skin integrity to high pressure areas\par Elevation and low sodium compliance. [FreeTextEntry4] : Pt is scheduled to have an arthroplasty on 12/7/21\par Culture reviewed with DPM. No abt therapy needed at this time.\par Pt to f/u in 1 week.

## 2021-11-25 DIAGNOSIS — Z96.1 PRESENCE OF INTRAOCULAR LENS: ICD-10-CM

## 2021-11-25 DIAGNOSIS — E11.621 TYPE 2 DIABETES MELLITUS WITH FOOT ULCER: ICD-10-CM

## 2021-11-25 DIAGNOSIS — Z79.82 LONG TERM (CURRENT) USE OF ASPIRIN: ICD-10-CM

## 2021-11-25 DIAGNOSIS — Z83.3 FAMILY HISTORY OF DIABETES MELLITUS: ICD-10-CM

## 2021-11-25 DIAGNOSIS — Z87.891 PERSONAL HISTORY OF NICOTINE DEPENDENCE: ICD-10-CM

## 2021-11-25 DIAGNOSIS — L97.526 NON-PRESSURE CHRONIC ULCER OF OTHER PART OF LEFT FOOT WITH BONE INVOLVEMENT WITHOUT EVIDENCE OF NECROSIS: ICD-10-CM

## 2021-11-25 DIAGNOSIS — E11.40 TYPE 2 DIABETES MELLITUS WITH DIABETIC NEUROPATHY, UNSPECIFIED: ICD-10-CM

## 2021-11-25 DIAGNOSIS — I11.9 HYPERTENSIVE HEART DISEASE WITHOUT HEART FAILURE: ICD-10-CM

## 2021-11-25 DIAGNOSIS — Z79.4 LONG TERM (CURRENT) USE OF INSULIN: ICD-10-CM

## 2021-11-25 DIAGNOSIS — Z79.84 LONG TERM (CURRENT) USE OF ORAL HYPOGLYCEMIC DRUGS: ICD-10-CM

## 2021-11-25 DIAGNOSIS — E11.59 TYPE 2 DIABETES MELLITUS WITH OTHER CIRCULATORY COMPLICATIONS: ICD-10-CM

## 2021-11-25 DIAGNOSIS — Z95.5 PRESENCE OF CORONARY ANGIOPLASTY IMPLANT AND GRAFT: ICD-10-CM

## 2021-12-03 ENCOUNTER — APPOINTMENT (OUTPATIENT)
Dept: WOUND CARE | Facility: HOSPITAL | Age: 61
End: 2021-12-03

## 2021-12-28 ENCOUNTER — NON-APPOINTMENT (OUTPATIENT)
Age: 61
End: 2021-12-28

## 2021-12-29 ENCOUNTER — APPOINTMENT (OUTPATIENT)
Dept: WOUND CARE | Facility: HOSPITAL | Age: 61
End: 2021-12-29
Payer: COMMERCIAL

## 2021-12-29 ENCOUNTER — OUTPATIENT (OUTPATIENT)
Dept: OUTPATIENT SERVICES | Facility: HOSPITAL | Age: 61
LOS: 1 days | Discharge: HOME | End: 2021-12-29
Payer: COMMERCIAL

## 2021-12-29 VITALS
BODY MASS INDEX: 32.6 KG/M2 | HEART RATE: 81 BPM | WEIGHT: 254 LBS | SYSTOLIC BLOOD PRESSURE: 175 MMHG | OXYGEN SATURATION: 96 % | TEMPERATURE: 98.1 F | RESPIRATION RATE: 18 BRPM | HEIGHT: 74 IN | DIASTOLIC BLOOD PRESSURE: 88 MMHG

## 2021-12-29 DIAGNOSIS — Z86.79 PERSONAL HISTORY OF OTHER DISEASES OF THE CIRCULATORY SYSTEM: ICD-10-CM

## 2021-12-29 DIAGNOSIS — E11.40 TYPE 2 DIABETES MELLITUS WITH DIABETIC NEUROPATHY, UNSPECIFIED: ICD-10-CM

## 2021-12-29 DIAGNOSIS — L97.526 NON-PRESSURE CHRONIC ULCER OF OTHER PART OF LEFT FOOT WITH BONE INVOLVEMENT WITHOUT EVIDENCE OF NECROSIS: ICD-10-CM

## 2021-12-29 DIAGNOSIS — L97.509 TYPE 2 DIABETES MELLITUS WITH FOOT ULCER: ICD-10-CM

## 2021-12-29 DIAGNOSIS — E11.621 TYPE 2 DIABETES MELLITUS WITH FOOT ULCER: ICD-10-CM

## 2021-12-29 DIAGNOSIS — I43 TYPE 2 DIABETES MELLITUS WITH OTHER CIRCULATORY COMPLICATIONS: ICD-10-CM

## 2021-12-29 DIAGNOSIS — E11.59 TYPE 2 DIABETES MELLITUS WITH OTHER CIRCULATORY COMPLICATIONS: ICD-10-CM

## 2021-12-29 PROCEDURE — G0463: CPT

## 2021-12-29 PROCEDURE — 99213 OFFICE O/P EST LOW 20 MIN: CPT

## 2021-12-29 RX ORDER — CLOPIDOGREL 75 MG/1
75 TABLET, FILM COATED ORAL
Refills: 0 | Status: ACTIVE | COMMUNITY

## 2021-12-29 NOTE — REVIEW OF SYSTEMS
[Fever] : no fever [Chills] : no chills [Eye Pain] : no eye pain [Earache] : no earache [Loss Of Hearing] : no hearing loss [Shortness Of Breath] : no shortness of breath [Abdominal Pain] : no abdominal pain [Joint Stiffness] : joint stiffness [Skin Wound] : skin wound [Anxiety] : no anxiety [Easy Bleeding] : no tendency for easy bleeding [FreeTextEntry5] : Cabg , CAD , Diabetic cardio vascular disease  [de-identified] :  + Om on MRI , DFU closed s/p revascularization  [de-identified] : IDDM with neuropathy , long term insulin use  [de-identified] : МАРИНА

## 2021-12-29 NOTE — PLAN
[FreeTextEntry1] : continue observation , patient discharged , happy with the outcome . If any wound occurs patient will return . He will f/u with all his outside specialists Spent 20 minutes for patient care and medical decision making.\par

## 2021-12-29 NOTE — PHYSICAL EXAM
[0] : left 0 [1+] : left 1+ [Varicose Veins Of Lower Extremities] : not present [Purpura] : no purpura  [Petechiae] : no petechiae [Skin Ulcer] : ulcer [Alert] : alert [Oriented to Person] : oriented to person [Oriented to Place] : oriented to place [Calm] : calm [de-identified] : calm [de-identified] : Cad , HTN , CABG  [de-identified] : hammer toes  [de-identified] : , 5th toe left foot + OM on MRI , closed after revascularization , - SOI  [de-identified] : Diabetic neuropathy  [FreeTextEntry1] : Left foot - 5thdigit - closed

## 2021-12-29 NOTE — ASSESSMENT
[Verbal] : Verbal [Patient] : Patient [Good - alert, interested, motivated] : Good - alert, interested, motivated [Verbalizes knowledge/Understanding] : Verbalizes knowledge/understanding [Foot Care] : foot care [Signs and symptoms of infection] : sign and symptoms of infection [How and When to Call] : how and when to call [Off-loading] : off-loading [Patient responsibility to plan of care] : patient responsibility to plan of care [Glycemic Control] : glycemic control [] : Yes [Labs and Tests] : labs and tests [Discharge Planning] : discharge planning [Stable] : stable [Home] : Home [Ambulatory] : Ambulatory [Not Applicable - Long Term Care/Home Health Agency] : Long Term Care/Home Health Agency: Not Applicable [FreeTextEntry2] : Maintain optimal skin integrity, discharge planning [FreeTextEntry4] : S/P Balloon angioplasty on 12/7/21 at [facility]\par Patient provided recent vascular studies (see lab)\par Wound closed - patient discharged\par

## 2021-12-29 NOTE — HISTORY OF PRESENT ILLNESS
[FreeTextEntry1] : 5th toe left foot , no open wounds , s/p revascularization to the left leg , no soi , no pain

## 2022-01-01 DIAGNOSIS — Z79.84 LONG TERM (CURRENT) USE OF ORAL HYPOGLYCEMIC DRUGS: ICD-10-CM

## 2022-01-01 DIAGNOSIS — Z83.3 FAMILY HISTORY OF DIABETES MELLITUS: ICD-10-CM

## 2022-01-01 DIAGNOSIS — I11.9 HYPERTENSIVE HEART DISEASE WITHOUT HEART FAILURE: ICD-10-CM

## 2022-01-01 DIAGNOSIS — Z95.5 PRESENCE OF CORONARY ANGIOPLASTY IMPLANT AND GRAFT: ICD-10-CM

## 2022-01-01 DIAGNOSIS — Z87.891 PERSONAL HISTORY OF NICOTINE DEPENDENCE: ICD-10-CM

## 2022-01-01 DIAGNOSIS — E11.40 TYPE 2 DIABETES MELLITUS WITH DIABETIC NEUROPATHY, UNSPECIFIED: ICD-10-CM

## 2022-01-01 DIAGNOSIS — E11.51 TYPE 2 DIABETES MELLITUS WITH DIABETIC PERIPHERAL ANGIOPATHY WITHOUT GANGRENE: ICD-10-CM

## 2022-01-01 DIAGNOSIS — Z95.1 PRESENCE OF AORTOCORONARY BYPASS GRAFT: ICD-10-CM

## 2022-01-01 DIAGNOSIS — Z79.82 LONG TERM (CURRENT) USE OF ASPIRIN: ICD-10-CM

## 2022-01-01 DIAGNOSIS — E11.621 TYPE 2 DIABETES MELLITUS WITH FOOT ULCER: ICD-10-CM

## 2022-01-01 DIAGNOSIS — Z79.4 LONG TERM (CURRENT) USE OF INSULIN: ICD-10-CM

## 2022-01-01 DIAGNOSIS — L97.526 NON-PRESSURE CHRONIC ULCER OF OTHER PART OF LEFT FOOT WITH BONE INVOLVEMENT WITHOUT EVIDENCE OF NECROSIS: ICD-10-CM

## 2024-02-25 ENCOUNTER — EMERGENCY (EMERGENCY)
Facility: HOSPITAL | Age: 64
LOS: 0 days | Discharge: HOME | End: 2024-02-25
Attending: EMERGENCY MEDICINE
Payer: COMMERCIAL

## 2024-02-25 VITALS
HEART RATE: 60 BPM | OXYGEN SATURATION: 100 % | RESPIRATION RATE: 16 BRPM | DIASTOLIC BLOOD PRESSURE: 71 MMHG | TEMPERATURE: 98 F | SYSTOLIC BLOOD PRESSURE: 125 MMHG

## 2024-02-25 VITALS
RESPIRATION RATE: 18 BRPM | SYSTOLIC BLOOD PRESSURE: 145 MMHG | TEMPERATURE: 98 F | OXYGEN SATURATION: 100 % | HEART RATE: 70 BPM | DIASTOLIC BLOOD PRESSURE: 74 MMHG | WEIGHT: 242.73 LBS

## 2024-02-25 DIAGNOSIS — M54.41 LUMBAGO WITH SCIATICA, RIGHT SIDE: ICD-10-CM

## 2024-02-25 DIAGNOSIS — E78.00 PURE HYPERCHOLESTEROLEMIA, UNSPECIFIED: ICD-10-CM

## 2024-02-25 DIAGNOSIS — E11.9 TYPE 2 DIABETES MELLITUS WITHOUT COMPLICATIONS: ICD-10-CM

## 2024-02-25 DIAGNOSIS — I25.10 ATHEROSCLEROTIC HEART DISEASE OF NATIVE CORONARY ARTERY WITHOUT ANGINA PECTORIS: ICD-10-CM

## 2024-02-25 DIAGNOSIS — M54.50 LOW BACK PAIN, UNSPECIFIED: ICD-10-CM

## 2024-02-25 DIAGNOSIS — I10 ESSENTIAL (PRIMARY) HYPERTENSION: ICD-10-CM

## 2024-02-25 PROCEDURE — 99283 EMERGENCY DEPT VISIT LOW MDM: CPT

## 2024-02-25 PROCEDURE — 99284 EMERGENCY DEPT VISIT MOD MDM: CPT

## 2024-02-25 RX ORDER — LIDOCAINE 4 G/100G
1 CREAM TOPICAL ONCE
Refills: 0 | Status: COMPLETED | OUTPATIENT
Start: 2024-02-25 | End: 2024-02-25

## 2024-02-25 RX ORDER — OXYCODONE HYDROCHLORIDE 5 MG/1
5 TABLET ORAL ONCE
Refills: 0 | Status: DISCONTINUED | OUTPATIENT
Start: 2024-02-25 | End: 2024-02-25

## 2024-02-25 RX ORDER — OXYCODONE HYDROCHLORIDE 5 MG/1
1 TABLET ORAL
Qty: 12 | Refills: 0
Start: 2024-02-25 | End: 2024-02-27

## 2024-02-25 RX ORDER — CYCLOBENZAPRINE HYDROCHLORIDE 10 MG/1
1 TABLET, FILM COATED ORAL
Qty: 12 | Refills: 0
Start: 2024-02-25 | End: 2024-02-28

## 2024-02-25 RX ORDER — CYCLOBENZAPRINE HYDROCHLORIDE 10 MG/1
10 TABLET, FILM COATED ORAL ONCE
Refills: 0 | Status: COMPLETED | OUTPATIENT
Start: 2024-02-25 | End: 2024-02-25

## 2024-02-25 RX ADMIN — LIDOCAINE 1 PATCH: 4 CREAM TOPICAL at 11:42

## 2024-02-25 RX ADMIN — CYCLOBENZAPRINE HYDROCHLORIDE 10 MILLIGRAM(S): 10 TABLET, FILM COATED ORAL at 11:43

## 2024-02-25 RX ADMIN — OXYCODONE HYDROCHLORIDE 5 MILLIGRAM(S): 5 TABLET ORAL at 11:42

## 2024-02-25 NOTE — ED ADULT NURSE NOTE - NS ED NURSE LEVEL OF CONSCIOUSNESS ORIENTATION
2018         RE:  Yola Amin   :  1962   MRN #:  [id number]       To Whom It May Concern:      I am writing this letter on behalf of your client, Yola Amin,  1962.  Ms. Amin is 55 years old.  She saw me in clinic for a potential breast reduction surgery.  She has been large breasted all of her adult life with a DDD bra and would like to get a breast reduction because a number of symptoms that she is having including upper back, neck and shoulder pain, shoulder grooving from bra straps, and inframammary fold rashes during summers.  She has been seeing a chiropractor and massage therapist as well as her medical doctors who have been prescribing her over-the-counter pain medication and appropriate supportive garments without continued relief.  Please see my dictated consult note from 2018 for details.        On examination, the patient has obvious breast hypertrophy and would be a very good candidate for breast reduction surgery.  Based on the Schnur scale, my plan is to remove 687 g from each breast, making her at least 90% smaller than she is.  For this, a free nipple graft may be also required.  The patient is okay with this plan.        I am writing on behalf of your client to get prior authorization for this procedure and appealing the denial that we have gotten from you given the fact that this is an exclusion in her insurance coverage.  I feel that this is a medically necessary procedure for her ongoing symptoms and will truly help her medical issues.  She has also got a mammogram in 10/2017, which was read as BI-RADS 2 benign.      I thank you for taking care of this sensitive issue.  Please do not hesitate to contact us if you have any questions.      Sincerely yours,         Tyler Vizcarra MD   Associate Professor of Surgery    Division of Plastic Surgery   [facility]         Sincerely,      AKIRA VIZCARRA MD             D: 2018   T:  2018   MT: [email or web address]      Name:     NORMA MANSFIELD   MRN:      [id number]        Account:      [id number]   :      1962      Document: [id number]     Oriented - self; Oriented - place; Oriented - time

## 2024-02-25 NOTE — ED ADULT NURSE NOTE - NSFALLOOBATTEMPT_ED_ALL_ED
Called and spoke to sis.   Explained that pt has dog bite and we added on Flagyl day after appt to cover for anaerobes empirically.  If pt 100% better at this point, no need to take.   If pt not better or getting worse, he should call and come in.    All questions were answered to the patient's and/or family's satisfaction and patient and/or family was/were appreciative of the phone call.      No

## 2024-02-25 NOTE — ED ADULT NURSE NOTE - OBJECTIVE STATEMENT
Pt is a 63yr old male, A&OX4 and ambulatory, c/o R lower back pain radiating down the R leg x2 weeks with worsening symptoms starting this morning. Unrelieved by Tylenol. No numbness/tingling or incontinence. Medicated as per MD order. In NAD.

## 2024-02-25 NOTE — ED STATDOCS - NSICDXPASTMEDICALHX_GEN_ALL_CORE_FT
PAST MEDICAL HISTORY:  CAD (coronary artery disease)     DM (diabetes mellitus)     H/O heart artery stent     History of quadruple bypass     S/P CABG (coronary artery bypass graft)

## 2024-02-25 NOTE — ED STATDOCS - OBJECTIVE STATEMENT
64 y/o M with PMHx of quadruple bypass, CAD, cardiac stents, DM presents to the ED c/o right leg stiffness and pain for the past 2 weeks as well as back pain, states that today around 5:30am the pain and stiffness became worse. Pt reports being unable to lay on his R side. Denies n/v/d/cp/sob/fever/chills/urinary sx. Pt follows with a vascular doctor. Nonsmoker, nondrinker. NKDA. Pt took 2 500mg Tylenol this morning for pain. Pt on AC.

## 2024-02-25 NOTE — ED STATDOCS - ATTENDING APP SHARED VISIT CONTRIBUTION OF CARE
I, Huan Rosado MD, personally saw the patient with ACP.  I have personally performed a face to face diagnostic evaluation on this patient.  I have reviewed the ACP note and agree with the history, exam, and plan of care, except as noted. I personally made/approved the management plan and take responsibility for the patient management   The initial assessment was performed by myself and then the patient was handed off to the ACP. The patient was followed and re-evaluated by the ACP. All labs, imaging and procedures were evaluated and performed by the ACP and I was available for consultation if any questions in the patients care came up.   I personally made/approved the management plan and take responsibility for the patient management.

## 2024-02-25 NOTE — ED STATDOCS - PHYSICAL EXAMINATION
Constitutional: NAD AAOx3  Eyes: PERRLA EOMI  Head: Normocephalic atraumatic  Mouth: MMM  Cardiac: regular rate, normal peripheral pulses  Resp: unlabored breathing  GI: Abd s/nt/nd  Neuro: grossly normal and intact  Skin: No visible rashes  MSK: ttp R Si joint, no midline spinal tenderness, normal straight leg raise, no saddle anesthesia

## 2024-02-25 NOTE — ED STATDOCS - NSFOLLOWUPINSTRUCTIONS_ED_ALL_ED_FT
Sciatica  Back view of a person showing a normal leg and a leg affected by the pain of sciatica.  Sciatica is pain, numbness, weakness, or tingling along the path of the sciatic nerve. The sciatic nerve starts in the lower back and runs down the back of each leg. The nerve controls the muscles in the lower leg and in the back of the knee. It also provides feeling (sensation) to the back of the thigh, the lower leg, and the sole of the foot. Sciatica is a symptom of another medical condition that pinches or puts pressure on the sciatic nerve.    Sciatica most often only affects one side of the body. Sciatica usually goes away on its own or with treatment. In some cases, sciatica may come back (recur).    What are the causes?  This condition is caused by pressure on the sciatic nerve or pinching of the nerve. This may be the result of:  A disk in between the bones of the spine bulging out too far (herniated disk).  Age-related changes in the spinal disks.  A pain disorder that affects a muscle in the buttock.  Extra bone growth near the sciatic nerve.  A break (fracture) of the pelvis.  Pregnancy.  Tumor. This is rare.  What increases the risk?  The following factors may make you more likely to develop this condition:  Playing sports that place pressure or stress on the spine.  Having poor strength and flexibility.  A history of back injury or surgery.  Sitting for long periods of time.  Doing activities that involve repetitive bending or lifting.  Obesity.  What are the signs or symptoms?  Symptoms can vary from mild to very severe. They may include:  Any of the following problems in the lower back, leg, hip, or buttock:  Mild tingling, numbness, or dull aches.  Burning sensations.  Sharp pains.  Numbness in the back of the calf or the sole of the foot.  Leg weakness.  Severe back pain that makes movement difficult.  Symptoms may get worse when you cough, sneeze, or laugh, or when you sit or stand for long periods of time.    How is this diagnosed?  This condition may be diagnosed based on:  Your symptoms and medical history.  A physical exam.  Blood tests.  Imaging tests, such as:  X-rays.  An MRI.  A CT scan.  How is this treated?  In many cases, this condition improves on its own without treatment. However, treatment may include:  Reducing or modifying physical activity.  Exercising, including strengthening and stretching.  Icing and applying heat to the affected area.  Medicines that help to:  Relieve pain and swelling.  Relax your muscles.  Injections of medicines that help to relieve pain and inflammation (steroids) around the sciatic nerve.  Surgery.  Follow these instructions at home:  Medicines    Take over-the-counter and prescription medicines only as told by your health care provider.  Ask your health care provider if the medicine prescribed to you requires you to avoid driving or using heavy machinery.  Managing pain    Bag of ice on a towel on the skin.  A heating pad for use on the affected area.  If directed, put ice on the affected area. To do this:  Put ice in a plastic bag.  Place a towel between your skin and the bag.  Leave the ice on for 20 minutes, 2–3 times a day.  If your skin turns bright red, remove the ice right away to prevent skin damage. The risk of skin damage is higher if you cannot feel pain, heat, or cold.  If directed, apply heat to the affected area as often as told by your health care provider. Use the heat source that your health care provider recommends, such as a moist heat pack or a heating pad.  Place a towel between your skin and the heat source.  Leave the heat on for 20–30 minutes.  If your skin turns bright red, remove the heat right away to prevent burns. The risk of burns is higher if you cannot feel pain, heat, or cold.  Activity    A comparison showing the right and wrong way to lift a heavy object.  Return to your normal activities as told by your health care provider. Ask your health care provider what activities are safe for you.  Avoid activities that make your symptoms worse.  Take brief periods of rest throughout the day.  When you rest for longer periods, mix in some mild activity or stretching between periods of rest. This will help to prevent stiffness and pain.  Avoid sitting for long periods of time without moving. Get up and move around at least one time each hour.  Exercise and stretch regularly as told by your health care provider.  Do not lift anything that is heavier than 10 lb (4.5 kg) until your health care provider says that it is safe. When you do not have symptoms, you should still avoid heavy lifting, especially repetitive heavy lifting.  When you lift objects, always use proper lifting technique, which includes:  Bending your knees.  Keeping the load close to your body.  Avoiding twisting.  General instructions    Maintain a healthy weight. Excess weight puts extra stress on your back.  Wear supportive, comfortable shoes. Avoid wearing high heels.  Avoid sleeping on a mattress that is too soft or too hard. A mattress that is firm enough to support your back when you sleep may help to reduce your pain.  Contact a health care provider if:  Your pain is not controlled by medicine.  Your pain does not improve or gets worse.  Your pain lasts longer than 4 weeks.  You have unexplained weight loss.  Get help right away if:  You are not able to control when you urinate or have bowel movements (incontinence).  You have:  Weakness in your lower back, pelvis, buttocks, or legs that gets worse.  Redness or swelling of your back.  A burning sensation when you urinate.  Summary  Sciatica is pain, numbness, weakness, or tingling along the path of the sciatic nerve, which may include the lower back, legs, hips, and buttocks.  This condition is caused by pressure on the sciatic nerve or pinching of the nerve.  Treatment often includes rest, exercise, medicines, and applying ice or heat.  This information is not intended to replace advice given to you by your health care provider. Make sure you discuss any questions you have with your health care provider.

## 2024-02-25 NOTE — ED STATDOCS - NS_ ATTENDINGSCRIBEDETAILS _ED_A_ED_FT
I, Huan Rosado MD,  performed the initial face to face bedside interview with this patient regarding history of present illness, review of symptoms and relevant past medical, social and family history.  I completed an independent physical examination.  I was the initial provider who evaluated this patient.   I personally saw the patient and performed a substantive portion of the visit including all aspects of the medical decision making.  The history, relevant review of systems, past medical and surgical history, medical decision making, and physical examination was documented by the scribe in my presence and I attest to the accuracy of the documentation.

## 2024-02-25 NOTE — ED ADULT TRIAGE NOTE - CHIEF COMPLAINT QUOTE
pt complains of right leg stiffness and pain for the past 2 weeks, states that today around 5:30am the pain and stiffness became worse. pmh of quadruple bypass and cardiac stents, DM. denies n/v/d/cp/sob/fever/chills.

## 2024-02-25 NOTE — ED STATDOCS - CLINICAL SUMMARY MEDICAL DECISION MAKING FREE TEXT BOX
63-year-old male history of hypertension high cholesterol CAD status post CABG peripheral vascular disease status post angioplasty and left leg presents to the emergency department with right leg pain.  Symptoms started few days ago thinks it was related to getting a new chair and sitting in it for long periods of time pain starts back of the right hip and radiates down leg worse with movements and certain positions.  Exam with no midline spinal tenderness there is tenderness palpation at the right SI joint positive straight leg raise on right no saddle anesthesia normal peripheral pulses no leg swelling patient likely with sciatica no signs of spinal cord injury no signs of DVT or arterial occlusion at this time will treat for sciatica and reassess.

## 2024-02-25 NOTE — ED STATDOCS - PROGRESS NOTE DETAILS
pt feeling much better after meds, able to ambulate with steady gait will dc home with pain control outpt f/u with spine, strict return precautions pt agreeable to dc and plan of care  Angelita Parra PA-C Patient seen and evaluated, ED attending note and orders reviewed, will continue with patient follow up and care -Angelita Parra PA-C

## 2024-10-16 ENCOUNTER — EMERGENCY (EMERGENCY)
Facility: HOSPITAL | Age: 64
LOS: 0 days | Discharge: HOME | End: 2024-10-16
Attending: STUDENT IN AN ORGANIZED HEALTH CARE EDUCATION/TRAINING PROGRAM
Payer: COMMERCIAL

## 2024-10-16 VITALS
RESPIRATION RATE: 20 BRPM | DIASTOLIC BLOOD PRESSURE: 76 MMHG | SYSTOLIC BLOOD PRESSURE: 141 MMHG | TEMPERATURE: 99 F | HEART RATE: 89 BPM | OXYGEN SATURATION: 96 %

## 2024-10-16 VITALS — WEIGHT: 230.38 LBS | HEIGHT: 70 IN

## 2024-10-16 DIAGNOSIS — I25.10 ATHEROSCLEROTIC HEART DISEASE OF NATIVE CORONARY ARTERY WITHOUT ANGINA PECTORIS: ICD-10-CM

## 2024-10-16 DIAGNOSIS — I50.9 HEART FAILURE, UNSPECIFIED: ICD-10-CM

## 2024-10-16 DIAGNOSIS — E11.9 TYPE 2 DIABETES MELLITUS WITHOUT COMPLICATIONS: ICD-10-CM

## 2024-10-16 DIAGNOSIS — F17.210 NICOTINE DEPENDENCE, CIGARETTES, UNCOMPLICATED: ICD-10-CM

## 2024-10-16 DIAGNOSIS — Z95.5 PRESENCE OF CORONARY ANGIOPLASTY IMPLANT AND GRAFT: ICD-10-CM

## 2024-10-16 PROBLEM — Z95.1 PRESENCE OF AORTOCORONARY BYPASS GRAFT: Chronic | Status: ACTIVE | Noted: 2024-02-25

## 2024-10-16 LAB
ADD ON TEST-SPECIMEN IN LAB: SIGNIFICANT CHANGE UP
ALBUMIN SERPL ELPH-MCNC: 3.7 G/DL — SIGNIFICANT CHANGE UP (ref 3.3–5)
ALP SERPL-CCNC: 101 U/L — SIGNIFICANT CHANGE UP (ref 40–120)
ALT FLD-CCNC: 65 U/L — SIGNIFICANT CHANGE UP (ref 12–78)
ANION GAP SERPL CALC-SCNC: 6 MMOL/L — SIGNIFICANT CHANGE UP (ref 5–17)
APTT BLD: 30.7 SEC — SIGNIFICANT CHANGE UP (ref 24.5–35.6)
AST SERPL-CCNC: 29 U/L — SIGNIFICANT CHANGE UP (ref 15–37)
BASOPHILS # BLD AUTO: 0.07 K/UL — SIGNIFICANT CHANGE UP (ref 0–0.2)
BASOPHILS NFR BLD AUTO: 0.8 % — SIGNIFICANT CHANGE UP (ref 0–2)
BILIRUB SERPL-MCNC: 0.8 MG/DL — SIGNIFICANT CHANGE UP (ref 0.2–1.2)
BUN SERPL-MCNC: 22 MG/DL — SIGNIFICANT CHANGE UP (ref 7–23)
CALCIUM SERPL-MCNC: 8.6 MG/DL — SIGNIFICANT CHANGE UP (ref 8.5–10.1)
CHLORIDE SERPL-SCNC: 111 MMOL/L — HIGH (ref 96–108)
CO2 SERPL-SCNC: 21 MMOL/L — LOW (ref 22–31)
CREAT SERPL-MCNC: 0.99 MG/DL — SIGNIFICANT CHANGE UP (ref 0.5–1.3)
D DIMER BLD IA.RAPID-MCNC: 345 NG/ML DDU — HIGH
EGFR: 85 ML/MIN/1.73M2 — SIGNIFICANT CHANGE UP
EOSINOPHIL # BLD AUTO: 0.23 K/UL — SIGNIFICANT CHANGE UP (ref 0–0.5)
EOSINOPHIL NFR BLD AUTO: 2.6 % — SIGNIFICANT CHANGE UP (ref 0–6)
FLUAV AG NPH QL: SIGNIFICANT CHANGE UP
FLUBV AG NPH QL: SIGNIFICANT CHANGE UP
GLUCOSE SERPL-MCNC: 148 MG/DL — HIGH (ref 70–99)
HCT VFR BLD CALC: 41.4 % — SIGNIFICANT CHANGE UP (ref 39–50)
HGB BLD-MCNC: 13.8 G/DL — SIGNIFICANT CHANGE UP (ref 13–17)
IMM GRANULOCYTES NFR BLD AUTO: 0.2 % — SIGNIFICANT CHANGE UP (ref 0–0.9)
INR BLD: 1 RATIO — SIGNIFICANT CHANGE UP (ref 0.85–1.16)
LYMPHOCYTES # BLD AUTO: 1.89 K/UL — SIGNIFICANT CHANGE UP (ref 1–3.3)
LYMPHOCYTES # BLD AUTO: 21.2 % — SIGNIFICANT CHANGE UP (ref 13–44)
MCHC RBC-ENTMCNC: 30.1 PG — SIGNIFICANT CHANGE UP (ref 27–34)
MCHC RBC-ENTMCNC: 33.3 GM/DL — SIGNIFICANT CHANGE UP (ref 32–36)
MCV RBC AUTO: 90.4 FL — SIGNIFICANT CHANGE UP (ref 80–100)
MONOCYTES # BLD AUTO: 0.71 K/UL — SIGNIFICANT CHANGE UP (ref 0–0.9)
MONOCYTES NFR BLD AUTO: 8 % — SIGNIFICANT CHANGE UP (ref 2–14)
NEUTROPHILS # BLD AUTO: 5.99 K/UL — SIGNIFICANT CHANGE UP (ref 1.8–7.4)
NEUTROPHILS NFR BLD AUTO: 67.2 % — SIGNIFICANT CHANGE UP (ref 43–77)
NT-PROBNP SERPL-SCNC: 6605 PG/ML — HIGH (ref 0–125)
PLATELET # BLD AUTO: 204 K/UL — SIGNIFICANT CHANGE UP (ref 150–400)
POTASSIUM SERPL-MCNC: 3.8 MMOL/L — SIGNIFICANT CHANGE UP (ref 3.5–5.3)
POTASSIUM SERPL-SCNC: 3.8 MMOL/L — SIGNIFICANT CHANGE UP (ref 3.5–5.3)
PROT SERPL-MCNC: 6.8 GM/DL — SIGNIFICANT CHANGE UP (ref 6–8.3)
PROTHROM AB SERPL-ACNC: 11.5 SEC — SIGNIFICANT CHANGE UP (ref 9.9–13.4)
RBC # BLD: 4.58 M/UL — SIGNIFICANT CHANGE UP (ref 4.2–5.8)
RBC # FLD: 14.6 % — HIGH (ref 10.3–14.5)
RSV RNA NPH QL NAA+NON-PROBE: SIGNIFICANT CHANGE UP
SARS-COV-2 RNA SPEC QL NAA+PROBE: SIGNIFICANT CHANGE UP
SODIUM SERPL-SCNC: 138 MMOL/L — SIGNIFICANT CHANGE UP (ref 135–145)
TROPONIN I, HIGH SENSITIVITY RESULT: 45.52 NG/L — SIGNIFICANT CHANGE UP
TROPONIN I, HIGH SENSITIVITY RESULT: 48.06 NG/L — SIGNIFICANT CHANGE UP
WBC # BLD: 8.91 K/UL — SIGNIFICANT CHANGE UP (ref 3.8–10.5)
WBC # FLD AUTO: 8.91 K/UL — SIGNIFICANT CHANGE UP (ref 3.8–10.5)

## 2024-10-16 PROCEDURE — 99285 EMERGENCY DEPT VISIT HI MDM: CPT

## 2024-10-16 PROCEDURE — 71275 CT ANGIOGRAPHY CHEST: CPT | Mod: 26,MC

## 2024-10-16 PROCEDURE — 85730 THROMBOPLASTIN TIME PARTIAL: CPT

## 2024-10-16 PROCEDURE — 93010 ELECTROCARDIOGRAM REPORT: CPT

## 2024-10-16 PROCEDURE — 71046 X-RAY EXAM CHEST 2 VIEWS: CPT

## 2024-10-16 PROCEDURE — 85610 PROTHROMBIN TIME: CPT

## 2024-10-16 PROCEDURE — 80053 COMPREHEN METABOLIC PANEL: CPT

## 2024-10-16 PROCEDURE — 85025 COMPLETE CBC W/AUTO DIFF WBC: CPT

## 2024-10-16 PROCEDURE — 71275 CT ANGIOGRAPHY CHEST: CPT | Mod: MC

## 2024-10-16 PROCEDURE — 99285 EMERGENCY DEPT VISIT HI MDM: CPT | Mod: 25

## 2024-10-16 PROCEDURE — 85379 FIBRIN DEGRADATION QUANT: CPT

## 2024-10-16 PROCEDURE — 94640 AIRWAY INHALATION TREATMENT: CPT

## 2024-10-16 PROCEDURE — 93005 ELECTROCARDIOGRAM TRACING: CPT

## 2024-10-16 PROCEDURE — 96374 THER/PROPH/DIAG INJ IV PUSH: CPT | Mod: XU

## 2024-10-16 PROCEDURE — 83880 ASSAY OF NATRIURETIC PEPTIDE: CPT

## 2024-10-16 PROCEDURE — 84484 ASSAY OF TROPONIN QUANT: CPT

## 2024-10-16 PROCEDURE — 0241U: CPT

## 2024-10-16 PROCEDURE — 71046 X-RAY EXAM CHEST 2 VIEWS: CPT | Mod: 26

## 2024-10-16 PROCEDURE — 36415 COLL VENOUS BLD VENIPUNCTURE: CPT

## 2024-10-16 RX ORDER — FUROSEMIDE 10 MG/ML
40 INJECTION INTRAVENOUS ONCE
Refills: 0 | Status: COMPLETED | OUTPATIENT
Start: 2024-10-16 | End: 2024-10-16

## 2024-10-16 RX ORDER — IPRATROPIUM BROMIDE AND ALBUTEROL SULFATE .5; 3 MG/3ML; MG/3ML
3 SOLUTION RESPIRATORY (INHALATION) ONCE
Refills: 0 | Status: COMPLETED | OUTPATIENT
Start: 2024-10-16 | End: 2024-10-16

## 2024-10-16 RX ADMIN — IPRATROPIUM BROMIDE AND ALBUTEROL SULFATE 3 MILLILITER(S): .5; 3 SOLUTION RESPIRATORY (INHALATION) at 07:35

## 2024-10-16 RX ADMIN — FUROSEMIDE 40 MILLIGRAM(S): 10 INJECTION INTRAVENOUS at 07:35

## 2024-10-16 NOTE — ED PROVIDER NOTE - CLINICAL SUMMARY MEDICAL DECISION MAKING FREE TEXT BOX
64-year-old male presenting with dry cough x 1 day in the setting of recently increased smoking frequency.  SpO2 90% on arrival, improved to 94% on 2 L nasal cannula.  Patient without accessory muscle use, no retractions, no JVD, coarse breath sounds diffusely.  Differential including but not limited to: COPD exacerbation, pneumonia, CHF, history and exam not consistent with PE, aortic pathology, pneumothorax.  Will evaluate with blood work, chest x-ray, symptomatic treatment, reassess.

## 2024-10-16 NOTE — ED PROVIDER NOTE - PHYSICAL EXAMINATION
GENERAL: A&Ox4, non-toxic appearing, no acute distress  HEENT: NCAT, EOMI, oral mucosa moist, normal conjunctiva  RESP: no respiratory distress, speaking in full sentences, coarse breath sounds diffusely, SpO2 94% on 2L NC  CV: RRR, no murmurs/rubs/gallops  ABDOMEN: soft, non-tender, non-distended, no guarding, no rebound tenderness  MSK: no visible deformities  NEURO: no focal sensory or motor deficits, CN 2-12 grossly intact  SKIN: warm, normal color, well perfused, no rash  PSYCH: normal affect

## 2024-10-16 NOTE — ED PROVIDER NOTE - PATIENT PORTAL LINK FT
You can access the FollowMyHealth Patient Portal offered by [facility] by registering at the following website: [email or web address]. By joining [facility]’s FollowMyHealth portal, you will also be able to view your health information using other applications (apps) compatible with our system.

## 2024-10-16 NOTE — ED ADULT NURSE REASSESSMENT NOTE - NS ED NURSE REASSESS COMMENT FT1
Assumed care of pt from Tonny RN, pt observed safe in stretcher in lowest position, call bell at side, updated on plan of care at this time, pt states he feels more comfortable with 2LNC.

## 2024-10-16 NOTE — ED ADULT TRIAGE NOTE - CHIEF COMPLAINT QUOTE
complains of shortness of breath with dry cough since 0200. symptoms worse when laying down. oxygen saturation 90% on arrival.   history of CHF, took extra dose lasix 25mg prior to arrival. cardiologist: dr keira holley.

## 2024-10-16 NOTE — ED PROVIDER NOTE - PROGRESS NOTE DETAILS
CTA without PE or pneumonia, findings consistent with CHF. Patient was given IV Lasix with diuresis >1 L.  Patient states he feels much better, SpO2 99% on room air.  Patient will call his cardiologist upon discharge for follow-up this week.  Discussed return precautions and all questions answered. Pt in agreement w/ plan. Patient demonstrates decision making capacity, NAD, VSS. Stable for d/c. Chest x-ray with mild interlobular thickening, blunting of the costophrenic angle.  Elevated proBNP which also is consistent with CHF exacerbation.  Elevated D-dimer less likely PE but will perform CTA to rule out.  Discussed these results and plan with patient and wife at bedside and all questions answered, they are in agreement with plan.

## 2024-10-16 NOTE — ED ADULT NURSE NOTE - SUICIDE SCREENING QUESTION 2
Last virtual visit with Dr Hernandez 12-9-2021  Last in person visit (physical) with Dr Hernandez 4-    No future OV scheduled    Pharm note given    Kelley Bishop RT (R)     No

## 2024-10-16 NOTE — ED PROVIDER NOTE - NSFOLLOWUPINSTRUCTIONS_ED_ALL_ED_FT
Continue Lasix as currently prescribed.    Call your cardiologist today to schedule an appointment for this week for follow-up.    ------------    Heart Failure    WHAT YOU NEED TO KNOW:    What is heart failure? Heart failure is a condition that does not allow your heart to fill or pump properly. Your heart cannot pump enough oxygen in your blood to your organs and tissues. Fluid may not move through your body properly. Fluid may build up and cause swelling and trouble breathing. This is known as congestive heart failure. Heart failure is a long-term condition that tends to get worse over time. It is important to manage your health to improve your quality of life.  Heart Failure    What are the signs and symptoms of heart failure? Signs and symptoms depend on the type of heart failure you have and how severe it is. You may have any of the following:    Trouble breathing with activity that worsens to trouble breathing at rest    Shortness of breath while lying flat    Severe shortness of breath and coughing at night that usually wakes you    Feeling lightheaded when you stand up    Purple color around your mouth and nails    Confusion or anxiety    Chest pain at night    Periods of no breathing, then breathing fast    Lack of energy (often worsened by physical activity), or trouble sleeping    Swelling in your ankles, legs, or abdomen    Heartbeat that is fast or not regular    Fingers and toes feel cool to the touch  How is heart failure diagnosed? Tell your healthcare provider about your health history and the medicines you take. Tell him or her if you have a family history of heart failure or cardiomyopathy. He or she will ask about your shortness of breath and other symptoms. You may need any of the following:    Blood tests are used to check for heart problems such as coronary artery disease or decreased blood flow. Blood tests also give healthcare providers information about your kidney, liver, and thyroid function. The results can also show an infection.    An EKG test records your heart rhythm and how fast your heart beats. It shows healthcare providers if you have heart block or have had a heart attack.    Echocardiogram is a type of ultrasound. Sound waves are used to show the structure and function of your heart. This test may show if there are problems with your heart valves. It may also show if the chambers of your heart are working properly.    X-ray, CT, or MRI pictures may be taken of your heart and lungs. The pictures may show the cause of your heart failure, or blood clots or fluid in your lungs. You may be given contrast liquid to help your heart show up better in the pictures. Tell the healthcare provider if you have ever had an allergic reaction to contrast liquid. Do not enter the MRI room with anything metal. Metal can cause serious injury. Tell the provider if you have any metal in or on your body.  How is heart failure treated? Heart failure is often caused by damage or injury to your heart. The damage may be caused by other heart problems, diabetes, or high blood pressure. The damage may have also been caused by an infection. Your healthcare providers will help you manage any other health conditions that may be causing your heart failure. The goals of treatment are to manage, slow, or reverse heart damage. Treatment may include the following:    Medicines may be given to help regulate your heart rhythm and lower your blood pressure. You may also need medicines to help decrease extra fluids. Medicines, such as NSAIDs, may be stopped if they are causing your heart failure to become worse. Do not stop any of your medicines on your own.    Cardiac rehab is a program run by specialists who will help you safely strengthen your heart. In the program you will learn about exercise, relaxation, stress management, and heart-healthy nutrition. Cardiac rehab may be recommended if your heart failure is not severe.    Oxygen may help you breathe easier if your oxygen level is lower than normal. A CPAP machine may be used to keep your airway open while you sleep.  CPAP      Surgery can be done to implant a pacemaker or another device in your chest to regulate your heart rhythm. Other types of surgery can open blocked heart vessels, replace a damaged heart valve, or remove scar tissue.  What can I do to manage swelling from extra fluid?    Elevate (raise) your legs above the level of your heart. This will help with fluid that builds up in your legs or ankles. Elevate your legs as often as possible during the day. Prop your legs on pillows or blankets to keep them elevated comfortably. Try not to stand for long periods of time during the day. Move around to keep your blood circulating.  Elevate Leg      Limit sodium (salt). Ask how much sodium you can have each day. Your healthcare provider may give you a limit, such as 2,300 milligrams (mg) a day. Your provider or a dietitian can teach you how to read food labels for the number of mg in a food. He or she can also help you find ways to have less salt. For example, if you add salt to food as you cook, do not add more at the table.        Drink liquids as directed. You may need to limit the amount of liquid you drink within 24 hours. Your healthcare provider will tell you how much liquid to have and which liquids are best for you. He or she may tell you to limit liquid to 1.5 to 2 liters in a day. He or she will also tell you how often to drink liquid throughout the day.    Weigh yourself every morning. Use the same scale, in the same spot. Do this after you use the bathroom, but before you eat or drink. Wear the same type of clothing each time. Write down your weight and call your healthcare provider if you have a sudden weight gain. Swelling and weight gain are signs of fluid buildup.  Weight Checks K[facility]  What can I do to manage heart failure? Your quality of life may improve with treatment and the following:    Do not smoke. Nicotine and other chemicals in cigarettes and cigars can cause lung and heart damage. Ask your healthcare provider for information if you currently smoke and need help to quit. E-cigarettes or smokeless tobacco still contain nicotine. Talk to your healthcare provider before you use these products.    Do not drink alcohol or use illegal drugs. Alcohol and drugs can increase your risk for high blood pressure, diabetes, and coronary artery disease.    Eat heart-healthy foods. Heart-healthy foods include fruits, vegetables, lean meat (such as beef, chicken, or pork), and low-fat dairy products. Fatty fish such as salmon and tuna are also heart healthy. Other heart-healthy foods include walnuts, whole-grain breads, and legumes such as clrak beans. Replace butter and margarine with heart-healthy oils such as olive oil or canola oil. Your provider or a dietitian can help you create heart-healthy meal plans.        Manage any chronic health conditions you have. These include high blood pressure, diabetes, obesity, high cholesterol, metabolic syndrome, and COPD. You will have fewer symptoms if you manage these health conditions. Follow your healthcare provider's recommendations and follow up with him or her regularly.    Maintain a healthy weight. Being overweight can increase your risk for high blood pressure, diabetes, and coronary artery disease. These conditions can make your symptoms worse. Ask your healthcare provider what a healthy weight is for you. Ask him or her to help you create a weight loss plan, if needed.    Stay active. Activity can help keep your symptoms from getting worse. Walking is a type of physical activity that helps maintain your strength and improve your mood. Physical activity also helps you manage your weight. Work with your healthcare provider to create an exercise plan that is right for you.  [facility] Walking for Exercise      Get vaccines as directed. The flu, COVID-19, and pneumonia can be severe for a person who has heart failure. Vaccines protect you from these infections. Get a flu vaccine every year as soon as it is recommended, usually in September or October. Get a COVID-19 vaccine and booster as directed. You may also need the pneumonia vaccine. Your healthcare provider can tell you if you need other vaccines, and when to get them.  Prevent Heart Disease   Where can I get support or more information? Heart failure can be difficult to manage. It may be helpful to talk with others who have heart failure. You may learn how to better manage your condition or get emotional support. For more information:    American [employer] Association  [address] [place] [id number]  Phone: 1-474.829.8779  Web Address: http://www.heart.org  Call your local emergency number (911 in the [place]) if:    You have any of the following signs of a heart attack:  Squeezing, pressure, or pain in your chest    You may also have any of the following:  Discomfort or pain in your back, neck, jaw, stomach, or arm    Shortness of breath    Nausea or vomiting    Lightheadedness or a sudden cold sweat    When should I seek immediate care?    Your heartbeat is fast, slow, or uneven all the time.    When should I call my doctor?    You have symptoms of worsening heart failure:  Shortness of breath at rest, at night, or that is getting worse in any way    Weight gain of 3 or more pounds (1.4 kg) in a day, or more than your healthcare provider says is okay    More swelling in your legs or ankles    Abdominal pain or swelling    More coughing    Feeling tired all the time    You feel depressed, or you have lost interest in things you used to enjoy.    You often feel worried or afraid.    You have questions or concerns about your condition or care.  CARE AGREEMENT:    You have the right to help plan your care. Learn about your health condition and how it may be treated. Discuss treatment options with your healthcare providers to decide what care you want to receive. You always have the right to refuse treatment.

## 2024-10-16 NOTE — ED PROVIDER NOTE - OBJECTIVE STATEMENT
64-year-old male PMH quadruple bypass, CAD, stent x 1, DM, presents to the emergency department for cough and shortness of breath.  Patient states she has had a course cough for the past few weeks, became persistently hacking earlier this morning which woke him up around 2 AM.  Recently went to [place] and was smoking more than normal, typically smokes 4-5 cigarettes daily.  No reported formal diagnosis of COPD or emphysema.  Took an extra dose of Lasix earlier today.  He does not complain of fever, chills, chest pain, lower extremity edema, or calf pain.    Cardiology– [facility]

## 2024-10-16 NOTE — ED ADULT NURSE REASSESSMENT NOTE - NS ED NURSE REASSESS COMMENT FT1
Rounding completed. MD at bedside providing PT with update. Call bell in reach. Pillow and warm blanket provided. No other questions or concerns at this time.